# Patient Record
Sex: FEMALE | Race: WHITE | NOT HISPANIC OR LATINO | URBAN - METROPOLITAN AREA
[De-identification: names, ages, dates, MRNs, and addresses within clinical notes are randomized per-mention and may not be internally consistent; named-entity substitution may affect disease eponyms.]

---

## 2023-03-20 ENCOUNTER — APPOINTMENT (RX ONLY)
Dept: URBAN - METROPOLITAN AREA OTHER 1 | Facility: OTHER | Age: 58
Setting detail: DERMATOLOGY
End: 2023-03-20

## 2023-03-20 DIAGNOSIS — L57.8 OTHER SKIN CHANGES DUE TO CHRONIC EXPOSURE TO NONIONIZING RADIATION: ICD-10-CM

## 2023-03-20 DIAGNOSIS — D18.0 HEMANGIOMA: ICD-10-CM

## 2023-03-20 DIAGNOSIS — L57.0 ACTINIC KERATOSIS: ICD-10-CM

## 2023-03-20 DIAGNOSIS — L82.0 INFLAMED SEBORRHEIC KERATOSIS: ICD-10-CM

## 2023-03-20 DIAGNOSIS — L82.1 OTHER SEBORRHEIC KERATOSIS: ICD-10-CM

## 2023-03-20 DIAGNOSIS — D22 MELANOCYTIC NEVI: ICD-10-CM

## 2023-03-20 PROBLEM — D22.5 MELANOCYTIC NEVI OF TRUNK: Status: ACTIVE | Noted: 2023-03-20

## 2023-03-20 PROBLEM — D18.01 HEMANGIOMA OF SKIN AND SUBCUTANEOUS TISSUE: Status: ACTIVE | Noted: 2023-03-20

## 2023-03-20 PROCEDURE — 99203 OFFICE O/P NEW LOW 30 MIN: CPT | Mod: 25

## 2023-03-20 PROCEDURE — 17000 DESTRUCT PREMALG LESION: CPT

## 2023-03-20 PROCEDURE — ? COUNSELING

## 2023-03-20 PROCEDURE — ? ADDITIONAL NOTES

## 2023-03-20 PROCEDURE — ? LIQUID NITROGEN

## 2023-03-20 PROCEDURE — ? OTHER

## 2023-03-20 ASSESSMENT — LOCATION ZONE DERM
LOCATION ZONE: TRUNK
LOCATION ZONE: FACE
LOCATION ZONE: LEG
LOCATION ZONE: NOSE

## 2023-03-20 ASSESSMENT — LOCATION SIMPLE DESCRIPTION DERM
LOCATION SIMPLE: NOSE
LOCATION SIMPLE: INFERIOR FOREHEAD
LOCATION SIMPLE: RIGHT EYEBROW
LOCATION SIMPLE: ABDOMEN
LOCATION SIMPLE: UPPER BACK
LOCATION SIMPLE: LEFT THIGH
LOCATION SIMPLE: LOWER BACK

## 2023-03-20 ASSESSMENT — LOCATION DETAILED DESCRIPTION DERM
LOCATION DETAILED: INFERIOR MID FOREHEAD
LOCATION DETAILED: SUPERIOR LUMBAR SPINE
LOCATION DETAILED: NASAL SUPRATIP
LOCATION DETAILED: LEFT ANTERIOR PROXIMAL THIGH
LOCATION DETAILED: SUPERIOR THORACIC SPINE
LOCATION DETAILED: RIGHT LATERAL EYEBROW
LOCATION DETAILED: LEFT ANTERIOR DISTAL THIGH
LOCATION DETAILED: EPIGASTRIC SKIN
LOCATION DETAILED: NASAL DORSUM

## 2023-03-20 ASSESSMENT — PAIN INTENSITY VAS: HOW INTENSE IS YOUR PAIN 0 BEING NO PAIN, 10 BEING THE MOST SEVERE PAIN POSSIBLE?: NO PAIN

## 2023-03-20 NOTE — PROCEDURE: LIQUID NITROGEN
Duration Of Freeze Thaw-Cycle (Seconds): 2
Consent: The patient's consent was obtained including but not limited to risks of crusting, scabbing, blistering, scarring, darker or lighter pigmentary change, recurrence, incomplete removal and infection.
Render Note In Bullet Format When Appropriate: No
Number Of Freeze-Thaw Cycles: 2 freeze-thaw cycles
Detail Level: Detailed
Show Applicator Variable?: Yes
Post-Care Instructions: I reviewed with the patient in detail post-care instructions. Patient is to wear sunprotection, and avoid picking at any of the treated lesions. Pt may apply Vaseline to crusted or scabbing areas.

## 2023-03-20 NOTE — PROCEDURE: OTHER
[address] [facility] Patient Status:  Inpatient    3/12/1918 MRN [id number]   Location [facility] 5SW/SE Attending Rukhsana Radford, DO   Hosp Day # 0 PCP Dominick Richards MD     Date:  2018  Date
per [employer]:  \"FX leslie 1996\"   • Hearing impairment     [place]   • Hearing loss     per [employer]:  \"Hearing loss/ear\"   • Heart attack ([facility] [address]    • High blood pressure    • Osteoarthritis    • Pure hypercholesterolemia    • Scarlet fever    • Senile osteop
mouth daily.  Glucosamine Chondroitin Maximum Strength 500 mg-400 mg capsule        Review of Systems:   A comprehensive review of systems was negative except for: Fatigue, back pain    Physical Exam:   Vital Signs:  Blood pressure (!) 87/42, pulse 77, temp
a lesser degree aortic annular calcifications. 6. Suspicion of pelvic floor laxity with a rectocele. 7. Postsurgical changes of L4-L5 posterior lumbar interbody fusion.  8. Chronic-appearing compression deformities of the T11, L1, and L5 vertebral bodies as
clinical documentation in H+P. Based on patients current state of illness, I anticipate that, after discharge, patient will require TBD.       Rhoda Moser,   7/4/2018
Note Text (......Xxx Chief Complaint.): This diagnosis correlates with the
Render Risk Assessment In Note?: no
Other (Free Text): A total of 1 lesion was treated with liquid nitrogen, located on the left anterior proximal thigh. The patient's consent was obtained including but not limited to risks of crusting, scabbing, blistering, scarring, darker or lighter pigmentary change, recurrence, incomplete removal and infection. I reviewed with the patient in detail post-care instructions. Patient is to wear sunprotection, and avoid picking at any of the treated lesions. Pt may apply Vaseline to crusted or scabbing areas.
Detail Level: Zone

## 2024-05-07 ENCOUNTER — APPOINTMENT (RX ONLY)
Dept: URBAN - METROPOLITAN AREA CLINIC 12 | Facility: CLINIC | Age: 59
Setting detail: DERMATOLOGY
End: 2024-05-07

## 2024-05-07 DIAGNOSIS — Z41.1 ENCOUNTER FOR COSMETIC SURGERY: ICD-10-CM

## 2024-05-07 PROCEDURE — ? CONSULTATION - FILLERS

## 2024-05-07 PROCEDURE — ? CONSULTATION - AGING FACE

## 2024-05-07 PROCEDURE — ? PATIENT SPECIFIC COUNSELING

## 2024-05-07 NOTE — PROCEDURE: CONSULTATION - FILLERS
Additional Area 2 Primary Filler Volume In Cc (Estimated): 0
Detail Level: Detailed
Send Procedure Quote As Charge: No
Cristhian Rivera Filler (Primary): Juvederm

## 2024-05-07 NOTE — PROCEDURE: PATIENT SPECIFIC COUNSELING
Detail Level: Simple
Other (Free Text): Patient presents for aging face counseling , she is unhappy with her lower face , neck and under eyes. Patient voiced that she wears contacts and is concern about dry eyes. \\Lakeshia examined the patient voiced that she is a good candidate for lower face and neck lift and buccal fat pad excision and fat transfer or  the cheeks. A little bit of filler and Botox to the lower face after the facelift can be done to lift the corners of the patients mouth. Patient is a good candidate for lower bleph ,fat and a little bit of muscle excision will help the lower eyelid appear less puffy which can be done here in the office. \\nThere is a loss muscle band under the neck. The incision sites placement was explained to the patient. Patient will stay at the hospital overnight . Patient needs to lay low for 8-14 days.  \\nPatient doesn’t have a lot of sun damage. Patient has more gravity and volume loss. Franchesca discussed the quote with the patient.

## 2024-05-07 NOTE — PROCEDURE: CONSULTATION - AGING FACE
Consultation Charge $ (Use Numbers Only, No Text Please.): 125.86
Detail Level: Detailed
Count Minor/Major Decisions Toward Mdm (Not Cosmetic)?: No

## 2024-12-13 ENCOUNTER — RX ONLY (RX ONLY)
Age: 59
End: 2024-12-13

## 2024-12-13 ENCOUNTER — APPOINTMENT (OUTPATIENT)
Dept: URBAN - METROPOLITAN AREA CLINIC 12 | Facility: CLINIC | Age: 59
Setting detail: DERMATOLOGY
End: 2024-12-13

## 2024-12-13 DIAGNOSIS — Z41.1 ENCOUNTER FOR COSMETIC SURGERY: ICD-10-CM

## 2024-12-13 PROCEDURE — ? PRE-OP WORKLIST

## 2024-12-13 PROCEDURE — ? OTHER (COSMETIC)

## 2024-12-13 RX ORDER — ERYTHROMYCIN 5 MG/G
SMALL AMOUNT OINTMENT OPHTHALMIC
Qty: 3.5 | Refills: 0 | Status: ERX | COMMUNITY
Start: 2024-12-13

## 2024-12-13 ASSESSMENT — LOCATION DETAILED DESCRIPTION DERM: LOCATION DETAILED: RIGHT INFERIOR ANTERIOR NECK

## 2024-12-13 ASSESSMENT — LOCATION ZONE DERM: LOCATION ZONE: NECK

## 2024-12-13 ASSESSMENT — LOCATION SIMPLE DESCRIPTION DERM: LOCATION SIMPLE: RIGHT ANTERIOR NECK

## 2024-12-13 NOTE — PROCEDURE: PRE-OP WORKLIST
Date Of Surgery: 12/18/24
Photos Taken?: yes
Detail Level: Simple
Surgeon: Dr. Elver Zarco
Surgery Scheduled: Lower face and neck lift with bilateral buccal fat pad removal , tc LL blepharoplasty with skin pinch and muscle removal

## 2024-12-13 NOTE — PROCEDURE: OTHER (COSMETIC)
Detail Level: Zone
Sticky Other (Free Text): No charge pre op visit
Price $ (Use Numbers Only, No Text Please.): 0.00

## 2024-12-20 ENCOUNTER — APPOINTMENT (OUTPATIENT)
Dept: URBAN - METROPOLITAN AREA CLINIC 12 | Facility: CLINIC | Age: 59
Setting detail: DERMATOLOGY
End: 2024-12-20

## 2024-12-20 DIAGNOSIS — Z48.89 ENCOUNTER FOR OTHER SPECIFIED SURGICAL AFTERCARE: ICD-10-CM

## 2024-12-20 PROCEDURE — 99024 POSTOP FOLLOW-UP VISIT: CPT

## 2024-12-20 PROCEDURE — ? POST-OP CHECK

## 2024-12-20 PROCEDURE — ? COUNSELING - POST-OP CHECK, FACELIFT

## 2024-12-20 ASSESSMENT — LOCATION ZONE DERM
LOCATION ZONE: FACE
LOCATION ZONE: FACE

## 2024-12-20 ASSESSMENT — LOCATION DETAILED DESCRIPTION DERM
LOCATION DETAILED: LEFT INFERIOR CENTRAL MALAR CHEEK
LOCATION DETAILED: LEFT SUPERIOR LATERAL BUCCAL CHEEK
LOCATION DETAILED: LEFT LATERAL MALAR CHEEK

## 2024-12-20 ASSESSMENT — LOCATION SIMPLE DESCRIPTION DERM
LOCATION SIMPLE: LEFT CHEEK
LOCATION SIMPLE: LEFT CHEEK

## 2024-12-23 ENCOUNTER — APPOINTMENT (OUTPATIENT)
Dept: URBAN - METROPOLITAN AREA CLINIC 12 | Facility: CLINIC | Age: 59
Setting detail: DERMATOLOGY
End: 2024-12-23

## 2024-12-23 DIAGNOSIS — Z48.89 ENCOUNTER FOR OTHER SPECIFIED SURGICAL AFTERCARE: ICD-10-CM

## 2024-12-23 PROCEDURE — ? COUNSELING - POST-OP CHECK, FACELIFT

## 2024-12-23 PROCEDURE — ? PATIENT SPECIFIC COUNSELING

## 2024-12-23 PROCEDURE — 99024 POSTOP FOLLOW-UP VISIT: CPT

## 2024-12-23 PROCEDURE — ? POST-OP CHECK

## 2024-12-23 ASSESSMENT — LOCATION DETAILED DESCRIPTION DERM
LOCATION DETAILED: LEFT LATERAL MALAR CHEEK
LOCATION DETAILED: LEFT SUPERIOR LATERAL BUCCAL CHEEK
LOCATION DETAILED: LEFT INFERIOR CENTRAL MALAR CHEEK

## 2024-12-23 ASSESSMENT — LOCATION ZONE DERM
LOCATION ZONE: FACE
LOCATION ZONE: FACE

## 2024-12-23 ASSESSMENT — LOCATION SIMPLE DESCRIPTION DERM
LOCATION SIMPLE: LEFT CHEEK
LOCATION SIMPLE: LEFT CHEEK

## 2024-12-23 NOTE — PROCEDURE: PATIENT SPECIFIC COUNSELING
Detail Level: Simple
Other (Free Text): Patient presents s/p lower face and neck lift with bilateral buccal fat pad removal , tc LL blepharoplasty with skin pinch and muscle removal performed on 12/18/24. Patient voiced doing well and her ear has improved. Patient voiced concerns about her scars. \\n\\Ivan Merino examined the patient and voiced everything looks good and is healing well. Dr. Merino voiced that it is normal for the incisions to be raised and red because it is early and it will take several weeks to flatten out, but once sutures come out patient will start taping to flatten the scars. Dr. Merino voiced there is induration behind the ear but not fluid, and normal swelling from the surgery. Dr. Merino advised patient to continue wearing chin strap and sleep elevated, to take arnica for bruising, and to apply ointment on the incisions. Dr. Merino examined the patient’s ears and voiced there is minimal wax and dry blood, and to continue using peroxide if needed. Patient can wear glasses.\\n\\nPatient’s BP on 12/23/24: 118/78\\n\\nPatient indicated understanding and RTC Friday with Dr. Zarco.

## 2024-12-27 ENCOUNTER — APPOINTMENT (OUTPATIENT)
Dept: URBAN - METROPOLITAN AREA CLINIC 12 | Facility: CLINIC | Age: 59
Setting detail: DERMATOLOGY
End: 2024-12-27

## 2024-12-27 DIAGNOSIS — Z48.89 ENCOUNTER FOR OTHER SPECIFIED SURGICAL AFTERCARE: ICD-10-CM

## 2024-12-27 PROCEDURE — 99024 POSTOP FOLLOW-UP VISIT: CPT

## 2024-12-27 PROCEDURE — ? COUNSELING - POST-OP CHECK, FACELIFT

## 2024-12-27 PROCEDURE — ? POST-OP CHECK

## 2024-12-27 PROCEDURE — ? SUTURE REMOVAL

## 2024-12-27 PROCEDURE — ? STAPLE REMOVAL

## 2024-12-27 PROCEDURE — ? PATIENT SPECIFIC COUNSELING

## 2024-12-27 ASSESSMENT — LOCATION DETAILED DESCRIPTION DERM
LOCATION DETAILED: RIGHT INFERIOR POSTAURICULAR SKIN
LOCATION DETAILED: LEFT SUPERIOR LATERAL BUCCAL CHEEK
LOCATION DETAILED: RIGHT SUPERIOR LATERAL NECK
LOCATION DETAILED: LEFT POSTAURICULAR CREASE
LOCATION DETAILED: LEFT LATERAL INFERIOR EYELID
LOCATION DETAILED: LEFT INFERIOR CENTRAL MALAR CHEEK
LOCATION DETAILED: RIGHT LATERAL INFERIOR EYELID
LOCATION DETAILED: LEFT SUPERIOR LATERAL NECK
LOCATION DETAILED: SUBMENTAL CHIN
LOCATION DETAILED: LEFT LATERAL MALAR CHEEK

## 2024-12-27 ASSESSMENT — LOCATION SIMPLE DESCRIPTION DERM
LOCATION SIMPLE: POSTERIOR NECK
LOCATION SIMPLE: LEFT CHEEK
LOCATION SIMPLE: LEFT EAR
LOCATION SIMPLE: LEFT INFERIOR EYELID
LOCATION SIMPLE: SUBMENTAL CHIN
LOCATION SIMPLE: POSTERIOR SCALP
LOCATION SIMPLE: RIGHT INFERIOR EYELID
LOCATION SIMPLE: LEFT CHEEK

## 2024-12-27 ASSESSMENT — LOCATION ZONE DERM
LOCATION ZONE: FACE
LOCATION ZONE: NECK
LOCATION ZONE: EAR
LOCATION ZONE: SCALP
LOCATION ZONE: EYELID
LOCATION ZONE: FACE

## 2025-01-14 ENCOUNTER — APPOINTMENT (OUTPATIENT)
Dept: URBAN - METROPOLITAN AREA CLINIC 12 | Facility: CLINIC | Age: 60
Setting detail: DERMATOLOGY
End: 2025-01-14

## 2025-01-14 DIAGNOSIS — Z48.89 ENCOUNTER FOR OTHER SPECIFIED SURGICAL AFTERCARE: ICD-10-CM

## 2025-01-14 PROCEDURE — ? PATIENT SPECIFIC COUNSELING

## 2025-01-14 PROCEDURE — ? COUNSELING - POST-OP CHECK, FACELIFT

## 2025-01-14 PROCEDURE — ? POST-OP CHECK

## 2025-01-14 PROCEDURE — 99024 POSTOP FOLLOW-UP VISIT: CPT

## 2025-01-14 ASSESSMENT — LOCATION ZONE DERM
LOCATION ZONE: FACE
LOCATION ZONE: FACE

## 2025-01-14 ASSESSMENT — LOCATION SIMPLE DESCRIPTION DERM
LOCATION SIMPLE: LEFT CHEEK
LOCATION SIMPLE: LEFT CHEEK

## 2025-01-14 NOTE — PROCEDURE: PATIENT SPECIFIC COUNSELING
Detail Level: Simple
Other (Free Text): Patient presents s/p lower face and neck lift with bilateral buccal fat pad removal , tc LL blepharoplasty with skin pinch and muscle removal performed on 12/18/24. Patient raised concerns about the scar under her chin patient states the scar is thick and raised \\n\\nDr. Nahai evaluated patient voiced her face looks great. Reassured the scar under her skin looks normal, it is lumpy bumpy but will subside with time. Recommended massaging with an electric tooth brush to help smooth the scar. Discussed silagen to help with scarring advised to continue applying the scar tape as well to help with scar fading. Patient may resume normal activity. Advised to follow up in 1 month.

## 2025-02-11 ENCOUNTER — APPOINTMENT (OUTPATIENT)
Dept: URBAN - METROPOLITAN AREA CLINIC 12 | Facility: CLINIC | Age: 60
Setting detail: DERMATOLOGY
End: 2025-02-11

## 2025-02-11 DIAGNOSIS — Z48.89 ENCOUNTER FOR OTHER SPECIFIED SURGICAL AFTERCARE: ICD-10-CM

## 2025-02-11 PROCEDURE — ? PATIENT SPECIFIC COUNSELING

## 2025-02-11 PROCEDURE — ? POST-OP CHECK

## 2025-02-11 PROCEDURE — ? COUNSELING - POST-OP CHECK, FACELIFT

## 2025-02-11 PROCEDURE — 99024 POSTOP FOLLOW-UP VISIT: CPT

## 2025-02-11 ASSESSMENT — LOCATION SIMPLE DESCRIPTION DERM
LOCATION SIMPLE: LEFT CHEEK
LOCATION SIMPLE: LEFT CHEEK

## 2025-02-11 ASSESSMENT — LOCATION DETAILED DESCRIPTION DERM
LOCATION DETAILED: LEFT LATERAL MALAR CHEEK
LOCATION DETAILED: LEFT INFERIOR CENTRAL MALAR CHEEK
LOCATION DETAILED: LEFT SUPERIOR LATERAL BUCCAL CHEEK

## 2025-02-11 ASSESSMENT — LOCATION ZONE DERM
LOCATION ZONE: FACE
LOCATION ZONE: FACE

## 2025-02-11 NOTE — PROCEDURE: PATIENT SPECIFIC COUNSELING
Detail Level: Simple
Other (Free Text): Patient presents s/p lower face and neck lift with bilateral buccal fat pad removal , tc LL blepharoplasty with skin pinch and muscle removal performed on 12/18/24. Patient is concerned about numbness and nodule on neck. \\n\\Ivan Zarco evaluated patient and voiced. Face looks great, incisions are well healed. Some evidence of redness due to her skin type, reassured the redness will subside with time.  Informed patient that the lumps are likely the knot from internal sutures that will take a few months to dissolve. Dr. Zarco exp,signed that if after 2 months the nodule are still present, he will inject a steroid to help the sutures dissolve quicker.  Advised patient to return back in 2 months.

## 2025-04-01 ENCOUNTER — APPOINTMENT (OUTPATIENT)
Dept: URBAN - METROPOLITAN AREA CLINIC 12 | Facility: CLINIC | Age: 60
Setting detail: DERMATOLOGY
End: 2025-04-01

## 2025-04-01 DIAGNOSIS — Z48.89 ENCOUNTER FOR OTHER SPECIFIED SURGICAL AFTERCARE: ICD-10-CM

## 2025-04-01 PROCEDURE — ? PATIENT SPECIFIC COUNSELING

## 2025-04-01 PROCEDURE — 99024 POSTOP FOLLOW-UP VISIT: CPT

## 2025-04-01 PROCEDURE — ? COUNSELING - POST-OP CHECK, FACELIFT

## 2025-04-01 PROCEDURE — ? POST-OP CHECK

## 2025-04-01 ASSESSMENT — LOCATION SIMPLE DESCRIPTION DERM
LOCATION SIMPLE: LEFT CHEEK
LOCATION SIMPLE: LEFT CHEEK

## 2025-04-01 ASSESSMENT — LOCATION ZONE DERM
LOCATION ZONE: FACE
LOCATION ZONE: FACE

## 2025-04-01 NOTE — PROCEDURE: PATIENT SPECIFIC COUNSELING
Detail Level: Simple
Other (Free Text): Patient presents s/p lower face and neck lift with bilateral buccal fat pad removal , tc LL blepharoplasty with skin pinch and muscle removal performed on 12/18/24. Patient has no concerns, is very happy with results.\\n\\Ivan Zarco evaluated patient and explained everything looks great, scars